# Patient Record
Sex: FEMALE | Race: WHITE | Employment: OTHER | URBAN - METROPOLITAN AREA
[De-identification: names, ages, dates, MRNs, and addresses within clinical notes are randomized per-mention and may not be internally consistent; named-entity substitution may affect disease eponyms.]

---

## 2017-07-06 PROBLEM — R53.1 WEAKNESS: Status: ACTIVE | Noted: 2017-07-06

## 2017-07-06 PROBLEM — Z86.718 HISTORY OF DVT (DEEP VEIN THROMBOSIS): Chronic | Status: ACTIVE | Noted: 2017-07-06

## 2017-07-06 PROBLEM — E03.9 ACQUIRED HYPOTHYROIDISM: Chronic | Status: ACTIVE | Noted: 2017-07-06

## 2017-07-07 PROBLEM — E06.3 AUTOIMMUNE HYPOTHYROIDISM: Chronic | Status: ACTIVE | Noted: 2017-07-06

## 2017-07-09 PROBLEM — I70.0 ATHEROSCLEROSIS OF AORTIC ARCH (HCC): Chronic | Status: ACTIVE | Noted: 2017-07-07

## 2017-07-31 PROBLEM — I70.90 ATHEROSCLEROTIC PLAQUE: Chronic | Status: ACTIVE | Noted: 2017-07-31

## 2019-09-30 ENCOUNTER — OFFICE VISIT (OUTPATIENT)
Dept: VASCULAR SURGERY | Age: 84
End: 2019-09-30
Payer: MEDICARE

## 2019-09-30 DIAGNOSIS — M79.604 PAIN IN BOTH LOWER EXTREMITIES: Primary | ICD-10-CM

## 2019-09-30 DIAGNOSIS — M79.605 PAIN IN BOTH LOWER EXTREMITIES: Primary | ICD-10-CM

## 2019-09-30 PROCEDURE — 1123F ACP DISCUSS/DSCN MKR DOCD: CPT | Performed by: NURSE PRACTITIONER

## 2019-09-30 PROCEDURE — G8421 BMI NOT CALCULATED: HCPCS | Performed by: NURSE PRACTITIONER

## 2019-09-30 PROCEDURE — 99213 OFFICE O/P EST LOW 20 MIN: CPT | Performed by: NURSE PRACTITIONER

## 2019-09-30 PROCEDURE — 4040F PNEUMOC VAC/ADMIN/RCVD: CPT | Performed by: NURSE PRACTITIONER

## 2019-09-30 PROCEDURE — G8427 DOCREV CUR MEDS BY ELIG CLIN: HCPCS | Performed by: NURSE PRACTITIONER

## 2019-09-30 PROCEDURE — G8598 ASA/ANTIPLAT THER USED: HCPCS | Performed by: NURSE PRACTITIONER

## 2019-09-30 PROCEDURE — 1036F TOBACCO NON-USER: CPT | Performed by: NURSE PRACTITIONER

## 2019-09-30 PROCEDURE — 1090F PRES/ABSN URINE INCON ASSESS: CPT | Performed by: NURSE PRACTITIONER

## 2019-09-30 NOTE — PROGRESS NOTES
 Stroke Sister     Heart Disease Father      Labs  Lab Results   Component Value Date    WBC 8.1 07/10/2017    HGB 10.3 (L) 07/10/2017    HCT 31.0 (L) 07/10/2017     07/10/2017    PROTIME 47.2 (H) 07/10/2017    INR 4.1 07/10/2017    APTT 20.7 (L) 06/30/2017    K 4.3 07/10/2017    BUN 12 07/10/2017    CREATININE 0.9 07/10/2017       PHYSICAL EXAM:    CONSTITUTIONAL:   Awake, alert, cooperative  PSYCHIATRIC :  Oriented to time, place and person     Appropriate insight to disease process  EYES: Lids and lashes normal  ENT:  External ears and nose without lesions   Hearing deficits notnoted  NECK: Supple, symmetrical, trachea midline   Carotid bruit not noted  LUNGS:  No increased work of breathing                 Clear to auscultation  CARDIOVASCULAR:  regular rate and rhythm   ABDOMEN:  soft, non-distended, non-tender   Aorta is not palpable  SKIN:   Normal skin color   Texture and turgor normal, no induration  EXTREMITIES:   R LE Edema absent   2+ DP/PT  L LE Edema absent   2+ DP/PT    RADIOLOGY:    A/P Bilateral Leg Pain  · Not related to circulation as pt has palpable pulses bilaterally   · Likely also not related to hx DVT, IVC filter as swelling is well controlled  · More likely related to knee joint  ·  Instructed pt to follow up with PCP regarding these symptoms   · all questions answered    Pt seen and plan reviewed with Dr. Jayro Bonilla.      Narciso Huang, CNP

## 2019-10-02 ENCOUNTER — HOSPITAL ENCOUNTER (OUTPATIENT)
Dept: GENERAL RADIOLOGY | Age: 84
Discharge: HOME | End: 2019-10-04
Payer: MEDICARE

## 2019-10-02 ENCOUNTER — HOSPITAL ENCOUNTER (OUTPATIENT)
Age: 84
Discharge: HOME | End: 2019-10-04
Payer: MEDICARE

## 2019-10-02 DIAGNOSIS — M25.561 RIGHT KNEE PAIN, UNSPECIFIED CHRONICITY: ICD-10-CM

## 2019-10-02 PROCEDURE — 73564 X-RAY EXAM KNEE 4 OR MORE: CPT

## 2021-01-20 ENCOUNTER — IMMUNIZATION (OUTPATIENT)
Dept: PRIMARY CARE CLINIC | Age: 86
End: 2021-01-20
Payer: MEDICARE

## 2021-01-20 PROCEDURE — 91300 COVID-19, PFIZER VACCINE 30MCG/0.3ML DOSE: CPT | Performed by: NURSE PRACTITIONER

## 2021-01-20 PROCEDURE — 0001A COVID-19, PFIZER VACCINE 30MCG/0.3ML DOSE: CPT | Performed by: NURSE PRACTITIONER

## 2021-02-10 ENCOUNTER — IMMUNIZATION (OUTPATIENT)
Dept: PRIMARY CARE CLINIC | Age: 86
End: 2021-02-10
Payer: MEDICARE

## 2021-02-10 PROCEDURE — 0002A COVID-19, PFIZER VACCINE 30MCG/0.3ML DOSE: CPT | Performed by: NURSE PRACTITIONER

## 2021-02-10 PROCEDURE — 91300 COVID-19, PFIZER VACCINE 30MCG/0.3ML DOSE: CPT | Performed by: NURSE PRACTITIONER

## 2021-07-28 ENCOUNTER — HOSPITAL ENCOUNTER (OUTPATIENT)
Age: 86
Discharge: HOME | End: 2021-07-30
Payer: MEDICARE

## 2021-07-28 ENCOUNTER — HOSPITAL ENCOUNTER (OUTPATIENT)
Dept: GENERAL RADIOLOGY | Age: 86
Discharge: HOME | End: 2021-07-30
Payer: MEDICARE

## 2021-07-28 DIAGNOSIS — M54.50 LOW BACK PAIN, UNSPECIFIED BACK PAIN LATERALITY, UNSPECIFIED CHRONICITY, UNSPECIFIED WHETHER SCIATICA PRESENT: ICD-10-CM

## 2021-07-28 PROCEDURE — 72110 X-RAY EXAM L-2 SPINE 4/>VWS: CPT

## 2021-08-12 ENCOUNTER — HOSPITAL ENCOUNTER (OUTPATIENT)
Dept: MRI IMAGING | Age: 86
Discharge: HOME | End: 2021-08-14
Payer: MEDICARE

## 2021-08-12 DIAGNOSIS — M54.50 LOW BACK PAIN, UNSPECIFIED BACK PAIN LATERALITY, UNSPECIFIED CHRONICITY, UNSPECIFIED WHETHER SCIATICA PRESENT: ICD-10-CM

## 2021-08-12 DIAGNOSIS — M79.605 LEFT LEG PAIN: ICD-10-CM

## 2021-08-12 PROCEDURE — 72148 MRI LUMBAR SPINE W/O DYE: CPT

## 2023-04-03 ENCOUNTER — HOSPITAL ENCOUNTER (OUTPATIENT)
Dept: NEUROLOGY | Age: 88
Discharge: HOME | End: 2023-04-03
Payer: MEDICARE

## 2023-04-03 VITALS — WEIGHT: 135 LBS | BODY MASS INDEX: 27.21 KG/M2 | HEIGHT: 59 IN

## 2023-04-03 PROCEDURE — 95886 MUSC TEST DONE W/N TEST COMP: CPT

## 2023-04-03 PROCEDURE — 95911 NRV CNDJ TEST 9-10 STUDIES: CPT

## 2023-04-03 NOTE — PROCEDURES
compatible with following:    #1. Normal nerve conduction studies of the following motor and sensory nerves bilaterally: Peroneal, tibial, superficial peroneal, and sural nerves. #2. Insertional activity abnormal with findings of positive waves, fibrillation potentials, change in amplitude and duration as well as polyphasic units all demonstrating neuropathic changes predominantly in the L5-S1 distribution however affecting L4 level bilaterally. These neuropathic changes affect both anterior and posterior rami fibers. This can be compatible with central spinal canal disease as well as diffuse lateral disease at the neuroforaminal areas. This represents a motor radiculopathy that may also affect sensory nerves etiology is to be determined. Recommendations: Further assessment with history, physical examination as well as imaging studies for appropriate relation and diagnosis. This has been discussed with the patient and her son. Please see letter enclosed in her chart. Thank you    . Clinical correlation recommended. Electronically signed by Demetri Baez MD on 2/1/5342 at 3:52 PM   Sullivan Krabbe is a 80 y.o. female patient. No diagnosis found. Past Medical History:   Diagnosis Date    Atherosclerotic plaque 7/31/2017    Aorta    Bilateral ovarian cysts     Disease of blood and blood forming organ     Hypothyroidism     Pneumonia     Temporary low platelet count (HCC)     Thrombosis 5/2015    PE     Height 4' 11\" (1.499 m), weight 135 lb (61.2 kg).     Procedures    Demetri Baez MD  5/2/1594

## 2023-04-03 NOTE — LETTER
sural nerves bilaterally. F waves of the peroneal and tibial nerves were normal bilaterally and H-reflexes of the tibial nerves bilaterally within normal limits. .     Insertional activity in the lower extremity revealing diffuse changes in increased amplitude and duration with decreased number in both quadriceps as well as muscles in the anterior and posterior compartment distal to the knees. In addition, fibrillation potentials, positive waves, serrated potentials and polyphasic units were found in the L5 and S1 predominant distribution by minor changes in the vastus medialis. Impression:  Study compatible with following:     #1. Normal nerve conduction studies of the following motor and sensory nerves bilaterally: Peroneal, tibial, superficial peroneal, and sural nerves. #2. Insertional activity abnormal with findings of positive waves, fibrillation potentials, change in amplitude and duration as well as polyphasic units all demonstrating neuropathic changes predominantly in the L5-S1 distribution however affecting L4 level bilaterally. These neuropathic changes affect both anterior and posterior rami fibers. This can be compatible with central spinal canal disease as well as diffuse lateral disease at the neuroforaminal areas. This represents a motor radiculopathy that may also affect sensory nerves etiology is to be determined. Recommendations: Further assessment with history, physical examination as well as imaging studies for appropriate relation and diagnosis. This has been discussed with the patient and her son. Please see letter enclosed in her chart. Thank you     . Clinical correlation recommended. Electronically signed by Monty Joshi MD on 0/8/4381 at 3:52 PM   Everton Morales is a 80 y.o. female patient. No diagnosis found.   Past Medical History        Past Medical History:   Diagnosis Date    Atherosclerotic plaque 7/31/2017     Aorta    Bilateral ovarian

## 2024-10-20 ENCOUNTER — APPOINTMENT (OUTPATIENT)
Dept: GENERAL RADIOLOGY | Age: 89
DRG: 552 | End: 2024-10-20
Payer: MEDICARE

## 2024-10-20 ENCOUNTER — APPOINTMENT (OUTPATIENT)
Dept: CT IMAGING | Age: 89
DRG: 552 | End: 2024-10-20
Payer: MEDICARE

## 2024-10-20 ENCOUNTER — HOSPITAL ENCOUNTER (INPATIENT)
Age: 89
LOS: 1 days | Discharge: HOME | DRG: 552 | End: 2024-10-21
Attending: EMERGENCY MEDICINE | Admitting: SURGERY
Payer: MEDICARE

## 2024-10-20 DIAGNOSIS — Z79.01 ANTICOAGULATED: ICD-10-CM

## 2024-10-20 DIAGNOSIS — S02.2XXA CLOSED FRACTURE OF NASAL BONE, INITIAL ENCOUNTER: ICD-10-CM

## 2024-10-20 DIAGNOSIS — S12.100A CLOSED ODONTOID FRACTURE, INITIAL ENCOUNTER (HCC): ICD-10-CM

## 2024-10-20 DIAGNOSIS — S01.81XA FACIAL LACERATION, INITIAL ENCOUNTER: Primary | ICD-10-CM

## 2024-10-20 DIAGNOSIS — S12.000A CLOSED DISPLACED FRACTURE OF FIRST CERVICAL VERTEBRA, UNSPECIFIED FRACTURE MORPHOLOGY, INITIAL ENCOUNTER (HCC): ICD-10-CM

## 2024-10-20 PROBLEM — S12.090A FRACTURE OF ANTERIOR ARCH OF C1, CLOSED, INITIAL ENCOUNTER (HCC): Status: ACTIVE | Noted: 2024-10-20

## 2024-10-20 LAB
ALBUMIN SERPL-MCNC: 3.9 G/DL (ref 3.5–5.2)
ALP SERPL-CCNC: 85 U/L (ref 35–104)
ALT SERPL-CCNC: 10 U/L (ref 0–32)
ANION GAP SERPL CALCULATED.3IONS-SCNC: 9 MMOL/L (ref 7–16)
AST SERPL-CCNC: 17 U/L (ref 0–31)
BASOPHILS # BLD: 0.03 K/UL (ref 0–0.2)
BASOPHILS NFR BLD: 0 % (ref 0–2)
BILIRUB SERPL-MCNC: 0.4 MG/DL (ref 0–1.2)
BUN SERPL-MCNC: 23 MG/DL (ref 6–23)
CALCIUM SERPL-MCNC: 9.2 MG/DL (ref 8.6–10.2)
CHLORIDE SERPL-SCNC: 102 MMOL/L (ref 98–107)
CO2 SERPL-SCNC: 25 MMOL/L (ref 22–29)
CREAT SERPL-MCNC: 1 MG/DL (ref 0.5–1)
EOSINOPHIL # BLD: 0.18 K/UL (ref 0.05–0.5)
EOSINOPHILS RELATIVE PERCENT: 2 % (ref 0–6)
ERYTHROCYTE [DISTWIDTH] IN BLOOD BY AUTOMATED COUNT: 13.6 % (ref 11.5–15)
GFR, ESTIMATED: 50 ML/MIN/1.73M2
GLUCOSE SERPL-MCNC: 114 MG/DL (ref 74–99)
HCT VFR BLD AUTO: 39.9 % (ref 34–48)
HGB BLD-MCNC: 12.6 G/DL (ref 11.5–15.5)
IMM GRANULOCYTES # BLD AUTO: 0.08 K/UL (ref 0–0.58)
IMM GRANULOCYTES NFR BLD: 1 % (ref 0–5)
INR PPP: 3.4
LYMPHOCYTES NFR BLD: 2.26 K/UL (ref 1.5–4)
LYMPHOCYTES RELATIVE PERCENT: 28 % (ref 20–42)
MCH RBC QN AUTO: 27.2 PG (ref 26–35)
MCHC RBC AUTO-ENTMCNC: 31.6 G/DL (ref 32–34.5)
MCV RBC AUTO: 86.2 FL (ref 80–99.9)
MONOCYTES NFR BLD: 0.48 K/UL (ref 0.1–0.95)
MONOCYTES NFR BLD: 6 % (ref 2–12)
NEUTROPHILS NFR BLD: 63 % (ref 43–80)
NEUTS SEG NFR BLD: 5.04 K/UL (ref 1.8–7.3)
PLATELET # BLD AUTO: 239 K/UL (ref 130–450)
PMV BLD AUTO: 8.8 FL (ref 7–12)
POTASSIUM SERPL-SCNC: 4 MMOL/L (ref 3.5–5)
PROT SERPL-MCNC: 7 G/DL (ref 6.4–8.3)
PROTHROMBIN TIME: 37.9 SEC (ref 9.3–12.4)
RBC # BLD AUTO: 4.63 M/UL (ref 3.5–5.5)
SODIUM SERPL-SCNC: 136 MMOL/L (ref 132–146)
WBC OTHER # BLD: 8.1 K/UL (ref 4.5–11.5)

## 2024-10-20 PROCEDURE — 70450 CT HEAD/BRAIN W/O DYE: CPT

## 2024-10-20 PROCEDURE — 85610 PROTHROMBIN TIME: CPT

## 2024-10-20 PROCEDURE — 6370000000 HC RX 637 (ALT 250 FOR IP)

## 2024-10-20 PROCEDURE — 99285 EMERGENCY DEPT VISIT HI MDM: CPT

## 2024-10-20 PROCEDURE — G0378 HOSPITAL OBSERVATION PER HR: HCPCS

## 2024-10-20 PROCEDURE — 72125 CT NECK SPINE W/O DYE: CPT

## 2024-10-20 PROCEDURE — 90714 TD VACC NO PRESV 7 YRS+ IM: CPT

## 2024-10-20 PROCEDURE — 0HQ1XZZ REPAIR FACE SKIN, EXTERNAL APPROACH: ICD-10-PCS | Performed by: SURGERY

## 2024-10-20 PROCEDURE — 1200000000 HC SEMI PRIVATE

## 2024-10-20 PROCEDURE — 72170 X-RAY EXAM OF PELVIS: CPT

## 2024-10-20 PROCEDURE — 12013 RPR F/E/E/N/L/M 2.6-5.0 CM: CPT

## 2024-10-20 PROCEDURE — 80053 COMPREHEN METABOLIC PANEL: CPT

## 2024-10-20 PROCEDURE — 71045 X-RAY EXAM CHEST 1 VIEW: CPT

## 2024-10-20 PROCEDURE — 70498 CT ANGIOGRAPHY NECK: CPT

## 2024-10-20 PROCEDURE — 2580000003 HC RX 258

## 2024-10-20 PROCEDURE — 6360000002 HC RX W HCPCS

## 2024-10-20 PROCEDURE — 70486 CT MAXILLOFACIAL W/O DYE: CPT

## 2024-10-20 PROCEDURE — 85025 COMPLETE CBC W/AUTO DIFF WBC: CPT

## 2024-10-20 PROCEDURE — 90471 IMMUNIZATION ADMIN: CPT

## 2024-10-20 PROCEDURE — 2500000003 HC RX 250 WO HCPCS

## 2024-10-20 PROCEDURE — 6360000004 HC RX CONTRAST MEDICATION: Performed by: RADIOLOGY

## 2024-10-20 RX ORDER — ACETAMINOPHEN 325 MG/1
650 TABLET ORAL EVERY 6 HOURS SCHEDULED
Status: DISCONTINUED | OUTPATIENT
Start: 2024-10-20 | End: 2024-10-21 | Stop reason: HOSPADM

## 2024-10-20 RX ORDER — ONDANSETRON 4 MG/1
4 TABLET, ORALLY DISINTEGRATING ORAL EVERY 8 HOURS PRN
Status: DISCONTINUED | OUTPATIENT
Start: 2024-10-20 | End: 2024-10-21 | Stop reason: HOSPADM

## 2024-10-20 RX ORDER — SODIUM CHLORIDE 0.9 % (FLUSH) 0.9 %
5-40 SYRINGE (ML) INJECTION PRN
Status: DISCONTINUED | OUTPATIENT
Start: 2024-10-20 | End: 2024-10-21 | Stop reason: HOSPADM

## 2024-10-20 RX ORDER — LISINOPRIL 2.5 MG/1
2.5 TABLET ORAL DAILY
COMMUNITY

## 2024-10-20 RX ORDER — SODIUM CHLORIDE 0.9 % (FLUSH) 0.9 %
10 SYRINGE (ML) INJECTION
Status: ACTIVE | OUTPATIENT
Start: 2024-10-20 | End: 2024-10-21

## 2024-10-20 RX ORDER — LEVOTHYROXINE SODIUM 75 UG/1
75 TABLET ORAL DAILY
Status: DISCONTINUED | OUTPATIENT
Start: 2024-10-20 | End: 2024-10-21 | Stop reason: HOSPADM

## 2024-10-20 RX ORDER — ONDANSETRON 2 MG/ML
4 INJECTION INTRAMUSCULAR; INTRAVENOUS EVERY 6 HOURS PRN
Status: DISCONTINUED | OUTPATIENT
Start: 2024-10-20 | End: 2024-10-21 | Stop reason: HOSPADM

## 2024-10-20 RX ORDER — IOPAMIDOL 755 MG/ML
60 INJECTION, SOLUTION INTRAVASCULAR
Status: COMPLETED | OUTPATIENT
Start: 2024-10-20 | End: 2024-10-20

## 2024-10-20 RX ORDER — POLYETHYLENE GLYCOL 3350 17 G/17G
17 POWDER, FOR SOLUTION ORAL DAILY PRN
Status: DISCONTINUED | OUTPATIENT
Start: 2024-10-20 | End: 2024-10-21 | Stop reason: HOSPADM

## 2024-10-20 RX ORDER — LIDOCAINE HYDROCHLORIDE 10 MG/ML
20 INJECTION, SOLUTION INFILTRATION; PERINEURAL ONCE
Status: COMPLETED | OUTPATIENT
Start: 2024-10-20 | End: 2024-10-20

## 2024-10-20 RX ORDER — OXYCODONE HYDROCHLORIDE 5 MG/1
5 TABLET ORAL EVERY 4 HOURS PRN
Status: DISCONTINUED | OUTPATIENT
Start: 2024-10-20 | End: 2024-10-21 | Stop reason: HOSPADM

## 2024-10-20 RX ORDER — SODIUM CHLORIDE 0.9 % (FLUSH) 0.9 %
5-40 SYRINGE (ML) INJECTION EVERY 12 HOURS SCHEDULED
Status: DISCONTINUED | OUTPATIENT
Start: 2024-10-20 | End: 2024-10-21 | Stop reason: HOSPADM

## 2024-10-20 RX ORDER — SODIUM CHLORIDE 9 MG/ML
INJECTION, SOLUTION INTRAVENOUS PRN
Status: DISCONTINUED | OUTPATIENT
Start: 2024-10-20 | End: 2024-10-21 | Stop reason: HOSPADM

## 2024-10-20 RX ORDER — OXYCODONE HYDROCHLORIDE 5 MG/1
2.5 TABLET ORAL EVERY 4 HOURS PRN
Status: DISCONTINUED | OUTPATIENT
Start: 2024-10-20 | End: 2024-10-21 | Stop reason: HOSPADM

## 2024-10-20 RX ORDER — SODIUM CHLORIDE 9 MG/ML
INJECTION, SOLUTION INTRAVENOUS CONTINUOUS
Status: DISCONTINUED | OUTPATIENT
Start: 2024-10-20 | End: 2024-10-20

## 2024-10-20 RX ADMIN — SODIUM CHLORIDE, PRESERVATIVE FREE 10 ML: 5 INJECTION INTRAVENOUS at 21:21

## 2024-10-20 RX ADMIN — LIDOCAINE HYDROCHLORIDE 20 ML: 10 INJECTION, SOLUTION INFILTRATION; PERINEURAL at 05:55

## 2024-10-20 RX ADMIN — ACETAMINOPHEN 650 MG: 325 TABLET ORAL at 17:13

## 2024-10-20 RX ADMIN — CLOSTRIDIUM TETANI TOXOID ANTIGEN (FORMALDEHYDE INACTIVATED) AND CORYNEBACTERIUM DIPHTHERIAE TOXOID ANTIGEN (FORMALDEHYDE INACTIVATED) 0.5 ML: 5; 2 INJECTION, SUSPENSION INTRAMUSCULAR at 04:35

## 2024-10-20 RX ADMIN — LEVOTHYROXINE SODIUM 75 MCG: 0.07 TABLET ORAL at 17:14

## 2024-10-20 RX ADMIN — IOPAMIDOL 60 ML: 755 INJECTION, SOLUTION INTRAVENOUS at 13:11

## 2024-10-20 ASSESSMENT — PAIN SCALES - WONG BAKER: WONGBAKER_NUMERICALRESPONSE: NO HURT

## 2024-10-20 ASSESSMENT — PAIN DESCRIPTION - DESCRIPTORS: DESCRIPTORS: ACHING;DISCOMFORT;SORE

## 2024-10-20 ASSESSMENT — PAIN - FUNCTIONAL ASSESSMENT: PAIN_FUNCTIONAL_ASSESSMENT: PREVENTS OR INTERFERES SOME ACTIVE ACTIVITIES AND ADLS

## 2024-10-20 ASSESSMENT — PAIN DESCRIPTION - LOCATION: LOCATION: NECK

## 2024-10-20 ASSESSMENT — PAIN SCALES - GENERAL: PAINLEVEL_OUTOF10: 1

## 2024-10-20 ASSESSMENT — LIFESTYLE VARIABLES
HOW OFTEN DO YOU HAVE A DRINK CONTAINING ALCOHOL: NEVER
HOW MANY STANDARD DRINKS CONTAINING ALCOHOL DO YOU HAVE ON A TYPICAL DAY: PATIENT DOES NOT DRINK

## 2024-10-20 NOTE — CONSULTS
89-year-old woman with a past medical history of taking Coumadin for history of deep venous thrombosis.  Patient reportedly fell at 3 AM in the morning and was taken to [facility] for evaluation.  Patient states that she did not lose consciousness but had severe neck pain after she fell.  Patient was evaluated by the trauma team and neurosurgery was called for neurodiagnostic imaging.  Head CT revealed no evidence of intracranial hemorrhage however the CT of the cervical spine revealed a type II dens fracture as well as a fracture of the anterior arch of C1.  Patient remains with normal neurologic examination.  Recommended the placement of the neck custom Aspen brace to be worn at all times.  Patient will need to follow-up as an outpatient with neurosurgery.  Fractures of this nature have poor union in the patients of this age and patient will most likely need a collar for a long term period.  These recommendations were discussed earlier in the early a.m. with the trauma team.  
TID  Follow up outpatient in 2 weeks    Patient seen and examined by resident. Will discuss with attending on call.     Electronically signed by Martin Hooker DO on 10/20/24 at 11:14 AM EDT

## 2024-10-20 NOTE — PROGRESS NOTES
Messaged Alyssa Ferguson regarding home medications of coumadin and Lisinopril d/t family requesting. Response was waiting on tertiary care and to be sure of no other injuries.

## 2024-10-20 NOTE — ED NOTES
External catheter failed.  Incontinence care provided, external catheter properly replaced.   Gen: AAOx3, non-toxic  Head: NCAT  HEENT: EOMI, oral mucosa moist, pharyngeal erythema with no exudates or uvula swelling. Right cervical painful lymphadenopathy.   Lung: CTAB, no respiratory distress, no wheezes/rhonchi/rales B/L, speaking in full sentences with hoarse voice  CV: RRR, no murmurs, rubs or gallops  Abd: soft, NTND, no guarding, no CVA tenderness  MSK: no visible deformities  Neuro: No focal sensory or motor deficits, normal CN exam   Skin: Warm, well perfused, no rash or hives  Psych: normal affect.

## 2024-10-20 NOTE — ED NOTES
Upon entering patients room, patient and present family members have patient out of bed and attempting to ambulate to the restroom. This RN informed family to hit call light if they need assistance and that patient should not be up out of bed due to present injuries and is a high fall risk. Assisted patient back to bed and put patient on pure wick. Patients family asking if pt can have water. Educated patient and family not at this time due to injuries to neck and per resident orders.

## 2024-10-20 NOTE — PROGRESS NOTES
Spoke with Thee from [place] and all papers faxed for [employer]. Spoke with Dr. Thorne regarding new consult and he stated and he had already seen patient in ER.  I added him to treatment team.

## 2024-10-20 NOTE — ED NOTES
External catheter failed. Incontinence care provided. Patient requesting bed pan from this point on. Disposable underpants applied.

## 2024-10-20 NOTE — PROGRESS NOTES
4 Eyes Skin Assessment     NAME:  Jeronimo Atkins  YOB: 1930  MEDICAL RECORD NUMBER:  [id number]    The patient is being assessed for  Admission    I agree that at least one RN has performed a thorough Head to Toe Skin Assessment on the patient. ALL assessment sites listed below have been assessed.      Areas assessed by both nurses:    Head, Face, Ears, Shoulders, Back, Chest, Arms, Elbows, Hands, Sacrum. Buttock, Coccyx, Ischium, and Legs. Feet and Heels    FACE: both eyes bruised and left brow laceration/sutured. NOSE: abrasion to bridge  BUE: ecchymosis  BUTTOCK: right side ecchymosis            Does the Patient have a Wound? No noted wound(s)       Andrei Prevention initiated by RN: Yes  Wound Care Orders initiated by RN: No    Pressure Injury (Stage 3,4, Unstageable, DTI, NWPT, and Complex wounds) if present, place Wound referral order by RN under : No    New Ostomies, if present place, Ostomy referral order under : No     Nurse 1 eSignature: Electronically signed by Veronica Rodriguez RN on 10/20/24 at 4:18 PM EDT    **SHARE this note so that the co-signing nurse can place an eSignature**    Nurse 2 eSignature: {Esignature:[id number]}

## 2024-10-20 NOTE — ED PROVIDER NOTES
every 30 days    ELASTIC BANDAGES & SUPPORTS (JOBST KNEE HIGH COMPRESSION SM) MISC    Compression stockings 20-30 mm hg thigh high bilaterally  Dx : Venous Insufficiency, Swelling, dvt  Please provide with michael    LEVOTHYROXINE (SYNTHROID) 88 MCG TABLET    Take 88 mcg by mouth Daily    WARFARIN (COUMADIN) 1 MG TABLET    Take 1 tab oral daily       ALLERGIES     Heparin    FAMILYHISTORY       Family History   Problem Relation Age of Onset    Stroke Sister     Heart Disease Father         SOCIAL HISTORY       Social History     Tobacco Use    Smoking status: Never    Smokeless tobacco: Never   Vaping Use    Vaping status: Never Used   Substance Use Topics    Alcohol use: No     Alcohol/week: 0.0 standard drinks of alcohol    Drug use: No       SCREENINGS        [place] Coma Scale  Eye Opening: Spontaneous  Best Verbal Response: Oriented  Best Motor Response: Obeys commands  [place] Coma Scale Score: 15                CIWA Assessment  BP: (!) 147/56  Pulse: 75           PHYSICAL EXAM  1 or more Elements     ED Triage Vitals   BP Systolic BP Percentile Diastolic BP Percentile Temp Temp Source Pulse Respirations SpO2   10/20/24 0412 -- -- 10/20/24 0406 10/20/24 0406 10/20/24 0412 10/20/24 0412 10/20/24 0412   (!) 157/91   98.4 °F (36.9 °C) Temporal 81 16 97 %      Height Weight - Scale         10/20/24 0412 10/20/24 0412         1.422 m (4' 8\") 56.2 kg (124 lb)                 Constitutional/General: Alert and oriented x3  Head: Normocephalic 3 cm laceration to the left eyebrow.  1 cm laceration across the bridge of the nose.  Eyes: PERRL, EOMI, conjunctiva normal, sclera non icteric  ENT:  Oropharynx clear, handling secretions, no trismus, no asymmetry of the posterior oropharynx or uvular edema  Neck: Supple, full ROM, no stridor, no meningeal signs.  No C-spine tenderness to palpation.  Respiratory: Lungs clear to auscultation bilaterally, no wheezes, rales, or rhonchi. Not in respiratory distress  Cardiovascular:

## 2024-10-20 NOTE — H&P
TRAUMA HISTORY & PHYSICAL  Attending/Surgical Resident/Advance Practice Nurse  10/20/2024  12:09 PM    PRIMARY SURVEY    CHIEF COMPLAINT:  Trauma consult.    89-year-old female presenting status post mechanical ground-level fall proximately 3 AM this morning.  States that she was in her bathroom when she tripped over her cane and hit her head.  Denies any loss of consciousness.  She was able to get up and clean up her blood and called her family.  She currently only complains of neck pain.  History significant for taking warfarin due to history of DVTs.  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  Initial INR 3.4.  CT head negative.  CT C-spine shows minimally displaced anterior arch C1 fracture and mildly displaced C2 dens fracture.  Negative for hematoma.  CT face shows bilateral nasal bone fracture.  She has a left supraorbital laceration that was s/p repair by ED.    AIRWAY:   Airway Normal    EMS ETT Absent  Noisy respirations Absent  Retractions: Absent  Vomiting/bleeding: Absent    BREATHING:    Midaxillary breath sound left:  Normal  Midaxillary breath sound right:  Normal    Cough sound intensity:  good    FiO2:  Room air    SMI 1250 mL.     CIRCULATION:   Femerol pulse intensity: Strong  Palpebral conjunctiva: Pink     Vitals:    10/20/24 1122   BP: (!) 152/63   Pulse: 80   Resp: 20   Temp:    SpO2: 96%       Vitals:    10/20/24 0800 10/20/24 0830 10/20/24 1115 10/20/24 1122   BP: (!) 157/61 (!) 148/56  (!) 152/63   Pulse: 76 82  80   Resp: 20 14  20   Temp:   98.6 °F (37 °C)    TempSrc:       SpO2: 96% 94%  96%   Weight:       Height:            FAST EXAM: Deferred    Central Nervous System    GCS Initial 15 minutes   Eye  Motor  Verbal 4 - Opens eyes on own  6 - Follows simple motor commands  5 - Alert and oriented 4 - Opens eyes on own  6 - Follows simple motor commands  5 - Alert and oriented     Neuromuscular blockade: No  Pupil size:  Left 3

## 2024-10-21 VITALS
DIASTOLIC BLOOD PRESSURE: 48 MMHG | TEMPERATURE: 97.7 F | HEIGHT: 56 IN | SYSTOLIC BLOOD PRESSURE: 125 MMHG | HEART RATE: 70 BPM | OXYGEN SATURATION: 97 % | WEIGHT: 124 LBS | RESPIRATION RATE: 16 BRPM | BODY MASS INDEX: 27.9 KG/M2

## 2024-10-21 LAB
ANION GAP SERPL CALCULATED.3IONS-SCNC: 10 MMOL/L (ref 7–16)
BASOPHILS # BLD: 0.04 K/UL (ref 0–0.2)
BASOPHILS NFR BLD: 0 % (ref 0–2)
BUN SERPL-MCNC: 16 MG/DL (ref 6–23)
CALCIUM SERPL-MCNC: 9.2 MG/DL (ref 8.6–10.2)
CHLORIDE SERPL-SCNC: 106 MMOL/L (ref 98–107)
CO2 SERPL-SCNC: 23 MMOL/L (ref 22–29)
CREAT SERPL-MCNC: 0.9 MG/DL (ref 0.5–1)
EOSINOPHIL # BLD: 0.1 K/UL (ref 0.05–0.5)
EOSINOPHILS RELATIVE PERCENT: 1 % (ref 0–6)
ERYTHROCYTE [DISTWIDTH] IN BLOOD BY AUTOMATED COUNT: 13.2 % (ref 11.5–15)
GFR, ESTIMATED: 59 ML/MIN/1.73M2
GLUCOSE SERPL-MCNC: 102 MG/DL (ref 74–99)
HCT VFR BLD AUTO: 40.6 % (ref 34–48)
HGB BLD-MCNC: 13.1 G/DL (ref 11.5–15.5)
IMM GRANULOCYTES # BLD AUTO: 0.05 K/UL (ref 0–0.58)
IMM GRANULOCYTES NFR BLD: 1 % (ref 0–5)
INR PPP: 3.5
LYMPHOCYTES NFR BLD: 1.62 K/UL (ref 1.5–4)
LYMPHOCYTES RELATIVE PERCENT: 16 % (ref 20–42)
MCH RBC QN AUTO: 27.2 PG (ref 26–35)
MCHC RBC AUTO-ENTMCNC: 32.3 G/DL (ref 32–34.5)
MCV RBC AUTO: 84.4 FL (ref 80–99.9)
MONOCYTES NFR BLD: 0.65 K/UL (ref 0.1–0.95)
MONOCYTES NFR BLD: 6 % (ref 2–12)
NEUTROPHILS NFR BLD: 76 % (ref 43–80)
NEUTS SEG NFR BLD: 7.73 K/UL (ref 1.8–7.3)
PLATELET # BLD AUTO: 238 K/UL (ref 130–450)
PMV BLD AUTO: 9.3 FL (ref 7–12)
POTASSIUM SERPL-SCNC: 4.4 MMOL/L (ref 3.5–5)
PROTHROMBIN TIME: 38.9 SEC (ref 9.3–12.4)
RBC # BLD AUTO: 4.81 M/UL (ref 3.5–5.5)
SODIUM SERPL-SCNC: 139 MMOL/L (ref 132–146)
WBC OTHER # BLD: 10.2 K/UL (ref 4.5–11.5)

## 2024-10-21 PROCEDURE — 97530 THERAPEUTIC ACTIVITIES: CPT

## 2024-10-21 PROCEDURE — 97165 OT EVAL LOW COMPLEX 30 MIN: CPT

## 2024-10-21 PROCEDURE — 36415 COLL VENOUS BLD VENIPUNCTURE: CPT

## 2024-10-21 PROCEDURE — G0378 HOSPITAL OBSERVATION PER HR: HCPCS

## 2024-10-21 PROCEDURE — 80048 BASIC METABOLIC PNL TOTAL CA: CPT

## 2024-10-21 PROCEDURE — 6370000000 HC RX 637 (ALT 250 FOR IP)

## 2024-10-21 PROCEDURE — 85025 COMPLETE CBC W/AUTO DIFF WBC: CPT

## 2024-10-21 PROCEDURE — 97161 PT EVAL LOW COMPLEX 20 MIN: CPT

## 2024-10-21 PROCEDURE — 2580000003 HC RX 258

## 2024-10-21 PROCEDURE — 99232 SBSQ HOSP IP/OBS MODERATE 35: CPT | Performed by: STUDENT IN AN ORGANIZED HEALTH CARE EDUCATION/TRAINING PROGRAM

## 2024-10-21 PROCEDURE — L0172 CERV COL SR FOAM 2PC PRE OTS: HCPCS

## 2024-10-21 PROCEDURE — 85610 PROTHROMBIN TIME: CPT

## 2024-10-21 PROCEDURE — 97535 SELF CARE MNGMENT TRAINING: CPT

## 2024-10-21 RX ADMIN — ACETAMINOPHEN 650 MG: 325 TABLET ORAL at 17:47

## 2024-10-21 RX ADMIN — SODIUM CHLORIDE, PRESERVATIVE FREE 10 ML: 5 INJECTION INTRAVENOUS at 08:38

## 2024-10-21 RX ADMIN — LEVOTHYROXINE SODIUM 75 MCG: 0.07 TABLET ORAL at 06:51

## 2024-10-21 RX ADMIN — ACETAMINOPHEN 650 MG: 325 TABLET ORAL at 11:39

## 2024-10-21 ASSESSMENT — ENCOUNTER SYMPTOMS
DIARRHEA: 0
COLOR CHANGE: 1
BLOOD IN STOOL: 0
BACK PAIN: 0
NAUSEA: 0
SHORTNESS OF BREATH: 0
FACIAL SWELLING: 1
VOMITING: 0
COUGH: 0
ABDOMINAL PAIN: 0
CONSTIPATION: 0

## 2024-10-21 NOTE — DISCHARGE INSTRUCTIONS
TRAUMA SERVICES DISCHARGE INSTRUCTIONS    **please follow up with PCP this week prior to resuming warfarin as INR is high and will need to begin rechecking    Call [phone], option 2, for any questions/concerns and for follow-up appointment in 2 week(s).    Please follow the instructions checked below:    Please follow-up with your primary care provider.    ACTIVITY INSTRUCTIONS  Increase activity as tolerated  No heavy lifting or strenuous activity  Take your incentive spirometer home and use 4-6 times/day   [x]  No driving until cleared by Trauma & Neurosurgery    WOUND/DRESSING INSTRUCTIONS:  You may shower.  No sitting in bath tub, hot tub or swimming until cleared by physician.  Ice to areas of pain for first 24 hours.  Heat to areas of pain after that.  Wash areas of lacerations/abrasions with soap & water.  Rinse well.  Pat dry with clean towel.  Apply thin layer of Bacitracin, Neosporin, or triple antibiotic cream to affected area 2-3 times per day.  Keep wounds clean and dry.       MEDICATION INSTRUCTIONS  Take medication as prescribed.  When taking pain medications, you may experience dizziness or drowsiness.  Do not drink alcohol or drive when taking these medications.  You may experience constipation while taking pain medication.  You may take over the counter stool softeners such as docusate (Colace), sennosides S (Senokot-S), or Miralax.   []  You may take Ibuprofen (over the counter) as directed for mild pain.     --You may take up to 800mg every 8 hours for pain, please take with food or milk.   [x]  You may take acetaminophen (Tylenol) products.  Do NOT take more than 4000mg of Tylenol in 24h.   [x]  Do not take any other acetaminophen (Tylenol) products if you are taking Percocet or Norco, as these contain Tylenol.   --Do NOT take more than 4000mg of Tylenol in 24h.    OPIOID MEDICATION INSTRUCTIONS  Read the medication guide that is included with your prescription.  Take your medication

## 2024-10-21 NOTE — PROGRESS NOTES
Physical Therapy Initial Evaluation    Name: Jeronimo Atkins  : 5/15/1930  MRN: [id number]      Date of Service: 10/21/2024    Evaluating PT:  Omar Ford, PT [id number]    Referring provider/PT Order:  PT Eval and Treat   10/20/24 1230  PT eval and treat  Start:  10/20/24 1230,   End:  10/20/24 1230,   ONE TIME,   Standing Count:  1 Occurrences,   R         Martin Sterling, DO     Room #:  5213/5213-A  Diagnosis:  Anticoagulated [Z79.01]  Facial laceration, initial encounter [S01.81XA]  Closed fracture of nasal bone, initial encounter [S02.2XXA]  Fracture of anterior arch of C1, closed, initial encounter (HCC) [S12.090A]  Closed displaced fracture of first cervical vertebra, unspecified fracture morphology, initial encounter (HCC) [S12.000A]  Closed odontoid fracture, initial encounter (HCC) [S12.100A]  PMHx/PSHx:     has a past medical history of Atherosclerotic plaque, Bilateral ovarian cysts, Disease of blood and blood forming organ, Hypothyroidism, Pneumonia, Temporary low platelet count (HCC), and Thrombosis.    has a past surgical history that includes Tonsillectomy; Cholecystectomy; Appendectomy; ECHO Limited (2015); Colonoscopy; and vascular surgery.     Procedure/Surgery:  none  Precautions:  Falls,   Cervical spinal precautions and Cervical Collar  Equipment Needs: Patient has needed equipment ,    SUBJECTIVE:    Patient lives alone  in a ranch home  with 3 steps to enter with 1Rail  Bed is on 1 floor and bath is on 1 floor.  Patient ambulated independently  PTA. Equipment owned: Cane and Wheeled Walker,      States she plans having Son stay with her upon D/C??      OBJECTIVE:   Initial Evaluation  Date: 10/21/24 Treatment Short Term/ Long Term   Goals   AM-PAC 6 Clicks      Was pt agreeable to Eval/treatment? Yes      Does pt have pain? no     Bed Mobility  Rolling: SBA  Supine to sit:   SBA   Sit to supine: SBA   Scooting: SBA   Cues for spinal precautions.   Rolling: Ind  Supine to sit:

## 2024-10-21 NOTE — CARE COORDINATION
10/21/24 Transition of Care: Patient is admitted after sustaining a fall at home. She does have a fracture and laceration on her eye/nose.  She is alert and oriented. She has a collar intact. She lives alone in a ranch home with 3 steps to enter. The bed/bath are on main floor. She is independent at home. She does own a cane and wheeled walker. She has a son who will be staying with her when she is discharged. She follows with Dr Nash and her pharmacy is [facility]. The plan is to return home. She worked with therapies and was able to walk 80ft with her ww and sba. She states she is not dizzy and she \"probably lost my balance\". Will follow for readiness to discharge. Electronically signed by Diamond Gutiérrez RN CM on 10/21/2024 at 10:10 AM      Case Management Assessment  Initial Evaluation    Date/Time of Evaluation: 10/21/2024 10:10 AM  Assessment Completed by: Diamond Gutiérrez RN    If patient is discharged prior to next notation, then this note serves as note for discharge by case management.    Patient Name: Jeronimo Atkins                   YOB: 1930  Diagnosis: Anticoagulated [Z79.01]  Facial laceration, initial encounter [S01.81XA]  Closed fracture of nasal bone, initial encounter [S02.2XXA]  Fracture of anterior arch of C1, closed, initial encounter ([facility]) [S12.090A]  Closed displaced fracture of first cervical vertebra, unspecified fracture morphology, initial encounter ([facility]) [S12.000A]  Closed odontoid fracture, initial encounter ([facility]) [S12.100A]                   Date / Time: 10/20/2024  4:25 AM    Patient Admission Status: Inpatient   Readmission Risk (Low < 19, Mod (19-27), High > 27): Readmission Risk Score: 8.3    Current PCP: Dewayne Nash, DO  PCP verified by CM? Yes    Chart Reviewed: Yes      History Provided by: Patient  Patient Orientation: Alert and Oriented    Patient Cognition: Alert    Hospitalization in the last 30 days (Readmission):  No    If yes,

## 2024-10-21 NOTE — PROGRESS NOTES
BRIEF COMPREHENSIVE GERIATRIC ASSESSMENT    Clinical Frailty Score (Evaristo Scale):  3-MANAGING WELL    Cognition:   [x]  Within normal limits     []  Mild cognitive impairment   []  Dementia  []  Delirium    Abbreviated Mental Test (AMT-4) score:  4 (age, , place, year; one point for knowing each)            []  Mental Capacity Assessment required (if AMT-4 score <4/4, consult speech for cog eval)  Main life-long occupation:    Highest level of education:  High School    Emotional:  [x]  Within normal limits     []  Dec mood     []  Depression  []  Anxiety  []  Fatigue    []  Hallucination     []  Delusion  []  Other  Motivation:    []  High    [x]  Usual  []  Low  Health attitude:     []  Excellent    []  Good    [x]  Fair    []  Poor     []  Couldn't say  Communication:  Speech:   [x]  WNL   []  Impaired Hearing:   []  WNL   []  Impaired           Vision:     []  WNL   [x]  Impaired (wear glasses) Understanding:   []  WNL   []  Impaired  Strength:   [x]  WNL   []  Weak Upper:    proximal    distal    Lower:    proximal    distal   Exercise:   []  Frequent    []  Occasional    [x]  None   What type of exercise?    Balance:   []  WNL   []  Impaired  # falls in last month:  None #falls in the last 6 months:  None  Mobility:  walk inside      [x]  Independent    []  Slow    []  Assisted    []  Can't         Walk outside   []  Independent    [x]  Slow    []  Assisted    []  Can't         Transfers         [x]  Independent    []  Slow    []  Assisted    []  Can't                    Bed (in/out)     [x]  Independent    []  Slow    []  Assisted    []  Can't                    Aid use            [x]  Independent    []  Slow    []  Assisted    []  Can't   Nutrition:  Weight:  [x]  Normal    []  Under    []  Over    []  Obese   []  Dietary consult           Appetite: [x]  WNL    []  Fair    []  Poor         Patient reported symptoms of dysphagia?  No Nurse reported? No         Patient passed a bedside swallow

## 2024-10-21 NOTE — PROGRESS NOTES
Occupational Therapy    OCCUPATIONAL THERAPY INITIAL EVALUATION    [facility]  [address] [place], [place]        Date:10/21/2024                                                  Patient Name: Jeronimo Atkins    MRN: [id number]    : 5/15/1930    Room: [address] [place] [address]          Evaluating OT: Maia Nicholson, OTD, OTR/L; [id number]      Occupational therapy physician order:   Start   Ordering Provider    10/20/24 1230  OT eval and treat  Start:  10/20/24 1230,   End:  10/20/24 1230,   ONE TIME,   Standing Count:  1 Occurrences,   R         Martin Sterling, DO        Diagnosis:    1. Facial laceration, initial encounter    2. Closed fracture of nasal bone, initial encounter    3. Closed odontoid fracture, initial encounter (HCC)    4. Closed displaced fracture of first cervical vertebra, unspecified fracture morphology, initial encounter ([facility])    5. 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)    Atherosclerotic plaque    Fracture of anterior arch of C1, closed, initial encounter (HCC)    Closed odontoid fracture (HCC)    Closed fracture of nasal bone    Facial laceration    Closed displaced fracture of first cervical vertebra (HCC)    Anticoagulated          Pertinent Medical History:   Past Medical History:   Diagnosis Date    Atherosclerotic plaque 2017    Aorta    Bilateral ovarian cysts     Disease of blood and blood forming organ     Hypothyroidism     Pneumonia     Temporary low platelet count (HCC)     Thrombosis 2015    PE          Surgery: none this admission        Recommended Adaptive Equipment: TBD       Precautions:  Fall Risk, C1,C2 fx, custom C collar, spinal precautions, nasal bone fx (no nose blowing x4-6 weeks and keep HOB elevated)     Assessment

## 2024-10-21 NOTE — PROGRESS NOTES
Trauma Tertiary Survey    Admit Date: 10/20/2024  Hospital day 1    CC:  s/p fall    Alcohol pre-screening:  Women: How many times in the past year have you had 4 or more drinks in a day? none  How much do you drink on a daily basis? denies    Drug Pre-screening:    How many times in the past year have you used a recreational drug or used a prescription medication for non medical reasons?  none    Mood Prescreening:    During the past two weeks, have you been bothered by little interest or pleasure doing things?  No  During the past two weeks, have you been bothered by feeling down, depressed or hopeless?  No      Scheduled Meds:   acetaminophen  650 mg Oral 4 times per day    sodium chloride flush  5-40 mL IntraVENous 2 times per day    levothyroxine  75 mcg Oral Daily     Continuous Infusions:   sodium chloride       PRN Meds:oxyCODONE **OR** oxyCODONE, sodium chloride flush, sodium chloride, ondansetron **OR** ondansetron, polyethylene glycol, sodium chloride flush    Subjective:     Resting in bed. No new complaints    Objective:   Patient Vitals for the past 8 hrs:   BP Temp Temp src Pulse Resp SpO2   10/21/24 0400 136/60 98.2 °F (36.8 °C) Temporal 74 16 96 %   10/21/24 0005 (!) 132/54 97.6 °F (36.4 °C) Temporal 89 16 95 %       I/O last 3 completed shifts:  In: 420 [P.O.:420]  Out: -   No intake/output data recorded.    Past Medical History:   Diagnosis Date    Atherosclerotic plaque 7/31/2017    Aorta    Bilateral ovarian cysts     Disease of blood and blood forming organ     Hypothyroidism     Pneumonia     Temporary low platelet count (HCC)     Thrombosis 5/2015    PE       @[email or web address]@    Radiology:  CTA NECK W CONTRAST   Final Result   No evidence of acute traumatic arterial injury in the neck.         XR CHEST PORTABLE   Final Result      No acute findings in the chest.         XR PELVIS (1-2 VIEWS)   Final Result   1. No acute traumatic findings of the bony pelvis on single view   2. Degenerative changes

## 2024-10-21 NOTE — PROGRESS NOTES
Instructed pt. And pts daughter trauma recommends stop taking warfarin due to INR level 3.5. And to follow up with PCP.

## 2024-10-21 NOTE — PLAN OF CARE
Problem: Discharge Planning  Goal: Discharge to home or other facility with appropriate resources  Outcome: Completed     Problem: Skin/Tissue Integrity  Goal: Absence of new skin breakdown  Description: 1.  Monitor for areas of redness and/or skin breakdown  2.  Assess vascular access sites hourly  3.  Every 4-6 hours minimum:  Change oxygen saturation probe site  4.  Every 4-6 hours:  If on nasal continuous positive airway pressure, respiratory therapy assess nares and determine need for appliance change or resting period.  Outcome: Completed     Problem: Safety - Adult  Goal: Free from fall injury  Outcome: Completed     Problem: ABCDS Injury Assessment  Goal: Absence of physical injury  Outcome: Completed

## 2024-10-21 NOTE — PROGRESS NOTES
GERIATRIC TRAUMA RESIDENT INITIAL ASSESSMENT    Chief Complaint   Patient presents with    Fall     Fall around 0300 walking to bathroom. Pt got dizzy, hit head on dresser. -nausea/vomiting. -LOC    Laceration     Lac to L eyebrow and nose        Mechanism of Injury:  mechanical ground-level fall at 3 AM on the day of admission.    Loss of consciousness:      HPI:  Patient states that she was in her bathroom when she tripped over her cane and hit her head.  She denies any loss of consciousness.  She was able to get up and clean up her blood and informed her family member.  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  On initial presentation she did complain of neck pain.  Labs were significant for INR 3.4.  CT head was negative.  CT cervical spine showed minimally displaced anterior arch C1 fracture and mildly displaced C2 fracture.  Negative for hematoma.  CT face shows bilateral nasal bone fracture.  She also did have a left supraorbital laceration that was s/p repaired by ED.    Past Medical History:   Diagnosis Date    Atherosclerotic plaque 7/31/2017    Aorta    Bilateral ovarian cysts     Disease of blood and blood forming organ     Hypothyroidism     Pneumonia     Temporary low platelet count (HCC)     Thrombosis 5/2015    PE       Past Surgical History:   Procedure Laterality Date    APPENDECTOMY      CHOLECYSTECTOMY      COLONOSCOPY      5 years ago scheduled 7/2015 with Dr Beltran    ECHOCARDIOGRAM LIMITED  5/5/2015         TONSILLECTOMY      VASCULAR SURGERY      IVC filter       Family History   Problem Relation Age of Onset    Stroke Sister     Heart Disease Father        Allergies   Allergen Reactions    Heparin      platelets drop       Social History     Tobacco Use    Smoking status: Never    Smokeless tobacco: Never   Vaping Use    Vaping status: Never Used   Substance Use Topics    Alcohol use: No     Alcohol/week: 0.0 standard drinks of alcohol    Drug use: No       Medications:  Medications

## 2024-10-21 NOTE — DISCHARGE SUMMARY
Physician Discharge Summary     Patient ID:  Jeronimo Tobaro  [id number]  89 y.o.  5/15/1930    Admit date: 10/20/2024    Discharge date: 10/21/24    Admitting Physician: Jeovanny Meyer MD     Admission Diagnoses: Anticoagulated [Z79.01]  Facial laceration, initial encounter [S01.81XA]  Closed fracture of nasal bone, initial encounter [S02.2XXA]  Fracture of anterior arch of C1, closed, initial encounter ([facility]) [S12.090A]  Closed displaced fracture of first cervical vertebra, unspecified fracture morphology, initial encounter ([facility]) [S12.000A]  Closed odontoid fracture, initial encounter ([facility]) [S12.100A]    Discharge Diagnoses: Principal Problem:    Fracture of anterior arch of C1, closed, initial encounter ([facility])  Active Problems:    Closed odontoid fracture ([facility])    Closed fracture of nasal bone    Facial laceration    Closed displaced fracture of first cervical vertebra ([facility])    Anticoagulated  Resolved Problems:    * No resolved hospital problems. *      Admission Condition: stable    Discharged Condition: stable    Indication for Admission: s/p mechanical fall    Hospital Course/Procedures/Operation/treatments:   10/20: Pt states that she was in her bathroom when she tripped over her cane and hit her head. Denies any loss of consciousness. She was able to get up and clean up her blood and called her family. She currently only complains of neck pain. History significant for taking warfarin due to history of DVTs. 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 Initial INR 3.4. CT head negative. CT C-spine shows minimally displaced anterior arch C1 fracture and mildly displaced C2 dens fracture. Negative for hematoma. CT face shows bilateral nasal bone fracture.   10/21: No new findings on tertiary. Resting in bed. Custom collar in place      Consults:   IP CONSULT TO TRAUMA SURGERY  IP CONSULT TO NEUROSURGERY  INPATIENT CONSULT TO ORTHOTIST/PROSTHETIST  IP CONSULT TO

## 2024-10-24 ENCOUNTER — TELEPHONE (OUTPATIENT)
Dept: ENT CLINIC | Age: 89
End: 2024-10-24

## 2024-10-24 ENCOUNTER — TELEPHONE (OUTPATIENT)
Dept: NEUROSURGERY | Age: 89
End: 2024-10-24

## 2024-10-24 DIAGNOSIS — S12.100A CLOSED ODONTOID FRACTURE, INITIAL ENCOUNTER (HCC): Primary | ICD-10-CM

## 2024-10-24 NOTE — TELEPHONE ENCOUNTER
Patients daughter called asking if it's ok for her to use a wedge pillow as it's hard for her to get out of bed laying flat. Or if there is another suggestion, they would be open to that as well. Please advise.

## 2024-10-24 NOTE — TELEPHONE ENCOUNTER
Pts daughter called to schedule a hospital follow up with Dr Navarrete.  She needs to be seen for a follow up for a nasal fracture.

## 2024-11-01 ENCOUNTER — OFFICE VISIT (OUTPATIENT)
Dept: SURGERY | Age: 89
End: 2024-11-01
Payer: MEDICARE

## 2024-11-01 VITALS
RESPIRATION RATE: 16 BRPM | SYSTOLIC BLOOD PRESSURE: 135 MMHG | TEMPERATURE: 98.7 F | HEIGHT: 56 IN | BODY MASS INDEX: 27.9 KG/M2 | OXYGEN SATURATION: 96 % | DIASTOLIC BLOOD PRESSURE: 59 MMHG | HEART RATE: 68 BPM | WEIGHT: 124 LBS

## 2024-11-01 DIAGNOSIS — S01.81XD FACIAL LACERATION, SUBSEQUENT ENCOUNTER: Primary | ICD-10-CM

## 2024-11-01 PROCEDURE — 99212 OFFICE O/P EST SF 10 MIN: CPT | Performed by: NURSE PRACTITIONER

## 2024-11-01 RX ORDER — WARFARIN SODIUM 3 MG/1
3 TABLET ORAL DAILY
COMMUNITY
Start: 2024-09-10

## 2024-11-01 ASSESSMENT — ENCOUNTER SYMPTOMS
SHORTNESS OF BREATH: 0
CHOKING: 0
CHEST TIGHTNESS: 0
COUGH: 0

## 2024-11-01 NOTE — PROGRESS NOTES
Left eyebrow laceration sutures removed without difficulty. Patient tolerated well.  Electronically signed by Zuleyma Gomez MA on 11/1/24 at 11:34 AM EDT    
were reviewed.  Emphasized the need to avoid falling, not being on any steps or ladders.  Also to maintain her collar and to follow-up with neurosurgery.  Make a follow-up as needed          GREGRO Scherer - CNP  11/1/2024  1:09 PM    Total time: 20 minutes

## 2024-11-11 ENCOUNTER — OFFICE VISIT (OUTPATIENT)
Dept: ENT CLINIC | Age: 89
End: 2024-11-11
Payer: MEDICARE

## 2024-11-11 VITALS — BODY MASS INDEX: 26.77 KG/M2 | WEIGHT: 119 LBS | HEIGHT: 56 IN | OXYGEN SATURATION: 99 % | TEMPERATURE: 97.3 F

## 2024-11-11 DIAGNOSIS — S02.2XXA CLOSED FRACTURE OF NASAL BONE, INITIAL ENCOUNTER: Primary | ICD-10-CM

## 2024-11-11 PROCEDURE — 1159F MED LIST DOCD IN RCRD: CPT | Performed by: OTOLARYNGOLOGY

## 2024-11-11 PROCEDURE — 1123F ACP DISCUSS/DSCN MKR DOCD: CPT | Performed by: OTOLARYNGOLOGY

## 2024-11-11 PROCEDURE — 99203 OFFICE O/P NEW LOW 30 MIN: CPT | Performed by: OTOLARYNGOLOGY

## 2024-11-11 NOTE — PROGRESS NOTES
[facility]  Dr. Julio Navarrete D.O. Ms.Ed.  New Consult       Patient Name:  Jeronimo Atkins  :  5/15/1930     CHIEF C/O:    Chief Complaint   Patient presents with    Facial Injury     Pt fell and broke her nose she also had stitches on her nose. She was at ED on 10/20/24       HISTORY OBTAINED FROM:  patient    HISTORY OF PRESENT ILLNESS:       Jeronimo is a 89 y.o. year old female, here today for:       Patient presents for evaluation of a history of status post traumatic fall with nasal bone fractures seen on CT scan of the facial bones approximately 7 days prior.  She denies any current facial swelling, she denies any difficulty breathing through the nose, and no active epistaxis.  CT scan is reviewed reviewed, with the patient as well as the daughter who is present with the patient today.  No other complaints today of new vision changes ear pain nasal congestion sore throat sore throat fever chills.           Past Medical History:   Diagnosis Date    Atherosclerotic plaque 2017    Aorta    Bilateral ovarian cysts     Disease of blood and blood forming organ     Hypothyroidism     Pneumonia     Temporary low platelet count (HCC)     Thrombosis 2015    PE     Past Surgical History:   Procedure Laterality Date    APPENDECTOMY      CHOLECYSTECTOMY      COLONOSCOPY      5 years ago scheduled 2015 with Dr Beltran    ECHOCARDIOGRAM LIMITED  2015         TONSILLECTOMY      VASCULAR SURGERY      IVC filter       Current Outpatient Medications:     JANTOVEN 3 MG tablet, Take 1 tablet by mouth daily, Disp: , Rfl:     lisinopril (PRINIVIL;ZESTRIL) 2.5 MG tablet, Take 1 tablet by mouth daily, Disp: , Rfl:     Cyanocobalamin (VITAMIN B 12 PO), Take by mouth, Disp: , Rfl:     levothyroxine (SYNTHROID) 88 MCG tablet, Take 75 mcg by mouth Daily, Disp: , Rfl:     calcium carbonate (OSCAL) 500 MG TABS tablet, Take 1 tablet by mouth daily, Disp: , Rfl:     Elastic Bandages & Supports (JOBST KNEE HIGH

## 2024-11-14 ASSESSMENT — ENCOUNTER SYMPTOMS
SHORTNESS OF BREATH: 0
VOMITING: 0
COUGH: 0

## 2024-11-21 ENCOUNTER — HOSPITAL ENCOUNTER (OUTPATIENT)
Dept: GENERAL RADIOLOGY | Age: 89
Discharge: HOME | End: 2024-11-23
Payer: MEDICARE

## 2024-11-21 ENCOUNTER — HOSPITAL ENCOUNTER (OUTPATIENT)
Age: 89
Discharge: HOME | End: 2024-11-23
Payer: MEDICARE

## 2024-11-21 DIAGNOSIS — S12.100A CLOSED ODONTOID FRACTURE, INITIAL ENCOUNTER (HCC): ICD-10-CM

## 2024-11-21 PROCEDURE — 72040 X-RAY EXAM NECK SPINE 2-3 VW: CPT

## 2024-11-29 ENCOUNTER — HOSPITAL ENCOUNTER (EMERGENCY)
Age: 88
Discharge: HOME | End: 2024-11-29
Attending: EMERGENCY MEDICINE
Payer: MEDICARE

## 2024-11-29 ENCOUNTER — APPOINTMENT (OUTPATIENT)
Dept: GENERAL RADIOLOGY | Age: 88
End: 2024-11-29
Payer: MEDICARE

## 2024-11-29 VITALS
RESPIRATION RATE: 16 BRPM | HEART RATE: 63 BPM | TEMPERATURE: 97.8 F | DIASTOLIC BLOOD PRESSURE: 60 MMHG | OXYGEN SATURATION: 96 % | SYSTOLIC BLOOD PRESSURE: 125 MMHG

## 2024-11-29 DIAGNOSIS — R11.2 NAUSEA AND VOMITING, UNSPECIFIED VOMITING TYPE: ICD-10-CM

## 2024-11-29 DIAGNOSIS — U07.1 COVID: Primary | ICD-10-CM

## 2024-11-29 LAB
ALBUMIN SERPL-MCNC: 4 G/DL (ref 3.5–5.2)
ALP SERPL-CCNC: 89 U/L (ref 35–104)
ALT SERPL-CCNC: 22 U/L (ref 0–32)
ANION GAP SERPL CALCULATED.3IONS-SCNC: 12 MMOL/L (ref 7–16)
AST SERPL-CCNC: 24 U/L (ref 0–31)
BASOPHILS # BLD: 0.03 K/UL (ref 0–0.2)
BASOPHILS NFR BLD: 1 % (ref 0–2)
BILIRUB SERPL-MCNC: 0.4 MG/DL (ref 0–1.2)
BNP SERPL-MCNC: 85 PG/ML (ref 0–450)
BUN SERPL-MCNC: 16 MG/DL (ref 6–23)
CALCIUM SERPL-MCNC: 9.1 MG/DL (ref 8.6–10.2)
CHLORIDE SERPL-SCNC: 98 MMOL/L (ref 98–107)
CO2 SERPL-SCNC: 20 MMOL/L (ref 22–29)
CREAT SERPL-MCNC: 1 MG/DL (ref 0.5–1)
EKG ATRIAL RATE: 87 BPM
EKG P AXIS: 46 DEGREES
EKG P-R INTERVAL: 140 MS
EKG Q-T INTERVAL: 350 MS
EKG QRS DURATION: 66 MS
EKG QTC CALCULATION (BAZETT): 421 MS
EKG R AXIS: -20 DEGREES
EKG T AXIS: 7 DEGREES
EKG VENTRICULAR RATE: 87 BPM
EOSINOPHIL # BLD: 0.15 K/UL (ref 0.05–0.5)
EOSINOPHILS RELATIVE PERCENT: 2 % (ref 0–6)
ERYTHROCYTE [DISTWIDTH] IN BLOOD BY AUTOMATED COUNT: 13.5 % (ref 11.5–15)
GFR, ESTIMATED: 52 ML/MIN/1.73M2
GLUCOSE SERPL-MCNC: 99 MG/DL (ref 74–99)
HCT VFR BLD AUTO: 43.1 % (ref 34–48)
HGB BLD-MCNC: 14 G/DL (ref 11.5–15.5)
IMM GRANULOCYTES # BLD AUTO: 0.06 K/UL (ref 0–0.58)
IMM GRANULOCYTES NFR BLD: 1 % (ref 0–5)
LACTATE BLDV-SCNC: 1 MMOL/L (ref 0.5–2.2)
LIPASE SERPL-CCNC: 15 U/L (ref 13–60)
LYMPHOCYTES NFR BLD: 1.29 K/UL (ref 1.5–4)
LYMPHOCYTES RELATIVE PERCENT: 20 % (ref 20–42)
MCH RBC QN AUTO: 27.6 PG (ref 26–35)
MCHC RBC AUTO-ENTMCNC: 32.5 G/DL (ref 32–34.5)
MCV RBC AUTO: 84.8 FL (ref 80–99.9)
MONOCYTES NFR BLD: 0.45 K/UL (ref 0.1–0.95)
MONOCYTES NFR BLD: 7 % (ref 2–12)
NEUTROPHILS NFR BLD: 70 % (ref 43–80)
NEUTS SEG NFR BLD: 4.62 K/UL (ref 1.8–7.3)
PLATELET # BLD AUTO: 291 K/UL (ref 130–450)
PMV BLD AUTO: 8.5 FL (ref 7–12)
POTASSIUM SERPL-SCNC: 4.8 MMOL/L (ref 3.5–5)
PROT SERPL-MCNC: 7.7 G/DL (ref 6.4–8.3)
RBC # BLD AUTO: 5.08 M/UL (ref 3.5–5.5)
SODIUM SERPL-SCNC: 130 MMOL/L (ref 132–146)
TROPONIN I SERPL HS-MCNC: 17 NG/L (ref 0–9)
TROPONIN I SERPL HS-MCNC: 17 NG/L (ref 0–9)
WBC OTHER # BLD: 6.6 K/UL (ref 4.5–11.5)

## 2024-11-29 PROCEDURE — 84484 ASSAY OF TROPONIN QUANT: CPT

## 2024-11-29 PROCEDURE — 83690 ASSAY OF LIPASE: CPT

## 2024-11-29 PROCEDURE — 99285 EMERGENCY DEPT VISIT HI MDM: CPT

## 2024-11-29 PROCEDURE — 96374 THER/PROPH/DIAG INJ IV PUSH: CPT

## 2024-11-29 PROCEDURE — 80053 COMPREHEN METABOLIC PANEL: CPT

## 2024-11-29 PROCEDURE — 83605 ASSAY OF LACTIC ACID: CPT

## 2024-11-29 PROCEDURE — 6360000002 HC RX W HCPCS: Performed by: EMERGENCY MEDICINE

## 2024-11-29 PROCEDURE — 85025 COMPLETE CBC W/AUTO DIFF WBC: CPT

## 2024-11-29 PROCEDURE — 71045 X-RAY EXAM CHEST 1 VIEW: CPT

## 2024-11-29 PROCEDURE — 83880 ASSAY OF NATRIURETIC PEPTIDE: CPT

## 2024-11-29 RX ORDER — ONDANSETRON 2 MG/ML
4 INJECTION INTRAMUSCULAR; INTRAVENOUS ONCE
Status: COMPLETED | OUTPATIENT
Start: 2024-11-29 | End: 2024-11-29

## 2024-11-29 RX ORDER — ONDANSETRON 4 MG/1
4 TABLET, ORALLY DISINTEGRATING ORAL 3 TIMES DAILY PRN
Qty: 21 TABLET | Refills: 0 | Status: SHIPPED | OUTPATIENT
Start: 2024-11-29

## 2024-11-29 RX ADMIN — ONDANSETRON 4 MG: 2 INJECTION INTRAMUSCULAR; INTRAVENOUS at 15:32

## 2024-11-29 ASSESSMENT — LIFESTYLE VARIABLES
HOW MANY STANDARD DRINKS CONTAINING ALCOHOL DO YOU HAVE ON A TYPICAL DAY: PATIENT DOES NOT DRINK
HOW OFTEN DO YOU HAVE A DRINK CONTAINING ALCOHOL: NEVER

## 2024-11-29 NOTE — ED PROVIDER NOTES
[facility] EMERGENCY DEPARTMENT  EMERGENCY DEPARTMENT ENCOUNTER        Pt Name: Jeronimo Atkins  MRN: [id number]  Birthdate 5/15/1930  Date of evaluation: 11/29/2024  Provider: Juli Bishop DO  PCP: Dewayne Nash DO  Note Started: 3:21 PM EST 11/29/24    CHIEF COMPLAINT       Chief Complaint   Patient presents with    Positive For Covid-19     Patient states she tested positive for Covid Tues. +n/v/d. States she is unable to keep anything down       HISTORY OF PRESENT ILLNESS: 1 or more Elements   History From: Patient    Limitations to history : None    Jeronimo Atkins is a 89 y.o. female who presents with concern for nausea and vomiting, diarrhea and cough.  She notes that she did test positive for COVID on Tuesday and she is been having a hard time eating since then, no fevers, cough is nonproductive, no chest pain or shortness of breath, no pain or swelling in her lower extremities.  Has otherwise been able to go about her daily activities.  She notes that she was in an accident about a month ago and has been wearing a c-collar since then, no other associated complaints.      EXTERNAL NOTE REVIEW:      On chart review last saw ENT for nasal bone fracture on 11/11/2024    REVIEW OF SYSTEMS :      Positives and Pertinent negatives as per HPI.     SURGICAL HISTORY     Past Surgical History:   Procedure Laterality Date    APPENDECTOMY      CHOLECYSTECTOMY      COLONOSCOPY      5 years ago scheduled 7/2015 with Dr Beltran    ECHOCARDIOGRAM LIMITED  5/5/2015         TONSILLECTOMY      VASCULAR SURGERY      IVC filter       CURRENTMEDICATIONS       Discharge Medication List as of 11/29/2024  6:19 PM        CONTINUE these medications which have NOT CHANGED    Details   JANTOVEN 3 MG tablet Take 1 tablet by mouth daily, DAWHistorical Med      lisinopril (PRINIVIL;ZESTRIL) 2.5 MG tablet Take 1 tablet by mouth dailyHistorical Med      Cyanocobalamin (VITAMIN B 12 PO) Take  program.  Efforts were made to edit the dictations but occasionally words are mis-transcribed.)    Juli Bishop DO (electronically signed)            Juli Bishop DO  11/29/24 0451

## 2024-12-03 LAB
EKG ATRIAL RATE: 87 BPM
EKG P AXIS: 46 DEGREES
EKG P-R INTERVAL: 140 MS
EKG Q-T INTERVAL: 350 MS
EKG QRS DURATION: 66 MS
EKG QTC CALCULATION (BAZETT): 421 MS
EKG R AXIS: -20 DEGREES
EKG T AXIS: 7 DEGREES
EKG VENTRICULAR RATE: 87 BPM

## 2024-12-04 ENCOUNTER — TELEPHONE (OUTPATIENT)
Dept: NEUROSURGERY | Age: 88
End: 2024-12-04

## 2024-12-04 NOTE — TELEPHONE ENCOUNTER
Patients sister called with question about her c spine xray on 11/21   she does have a follow up appointment with [facility] on 12-12  they want to know if you want another xray before her appointment

## 2024-12-09 ENCOUNTER — HOSPITAL ENCOUNTER (EMERGENCY)
Age: 88
Discharge: HOME | End: 2024-12-09
Payer: MEDICARE

## 2024-12-09 VITALS
TEMPERATURE: 98.2 F | OXYGEN SATURATION: 99 % | HEIGHT: 67 IN | BODY MASS INDEX: 18.05 KG/M2 | SYSTOLIC BLOOD PRESSURE: 144 MMHG | HEART RATE: 89 BPM | RESPIRATION RATE: 16 BRPM | DIASTOLIC BLOOD PRESSURE: 59 MMHG | WEIGHT: 115 LBS

## 2024-12-09 DIAGNOSIS — S61.411A SKIN TEAR OF HAND WITHOUT COMPLICATION, RIGHT, INITIAL ENCOUNTER: Primary | ICD-10-CM

## 2024-12-09 PROCEDURE — 99282 EMERGENCY DEPT VISIT SF MDM: CPT

## 2024-12-09 ASSESSMENT — PAIN DESCRIPTION - ONSET: ONSET: ON-GOING

## 2024-12-09 ASSESSMENT — PAIN DESCRIPTION - PAIN TYPE: TYPE: ACUTE PAIN

## 2024-12-09 ASSESSMENT — PAIN DESCRIPTION - DESCRIPTORS: DESCRIPTORS: DISCOMFORT

## 2024-12-09 ASSESSMENT — PAIN DESCRIPTION - ORIENTATION: ORIENTATION: RIGHT

## 2024-12-09 ASSESSMENT — PAIN DESCRIPTION - LOCATION: LOCATION: HAND

## 2024-12-09 ASSESSMENT — PAIN SCALES - GENERAL: PAINLEVEL_OUTOF10: 3

## 2024-12-09 ASSESSMENT — PAIN - FUNCTIONAL ASSESSMENT: PAIN_FUNCTIONAL_ASSESSMENT: 0-10

## 2024-12-09 ASSESSMENT — PAIN DESCRIPTION - FREQUENCY: FREQUENCY: CONTINUOUS

## 2024-12-10 ASSESSMENT — ENCOUNTER SYMPTOMS
RESPIRATORY NEGATIVE: 1
ALLERGIC/IMMUNOLOGIC NEGATIVE: 1
EYES NEGATIVE: 1
GASTROINTESTINAL NEGATIVE: 1

## 2024-12-11 DIAGNOSIS — Z01.818 PRE-OP TESTING: Primary | ICD-10-CM

## 2024-12-11 DIAGNOSIS — S12.090A FRACTURE OF ANTERIOR ARCH OF C1, CLOSED, INITIAL ENCOUNTER (HCC): ICD-10-CM

## 2024-12-12 ENCOUNTER — OFFICE VISIT (OUTPATIENT)
Dept: NEUROSURGERY | Age: 88
End: 2024-12-12
Payer: MEDICARE

## 2024-12-12 DIAGNOSIS — S12.100D CLOSED ODONTOID FRACTURE WITH ROUTINE HEALING, SUBSEQUENT ENCOUNTER: Primary | ICD-10-CM

## 2024-12-12 PROCEDURE — 1159F MED LIST DOCD IN RCRD: CPT | Performed by: STUDENT IN AN ORGANIZED HEALTH CARE EDUCATION/TRAINING PROGRAM

## 2024-12-12 PROCEDURE — 1123F ACP DISCUSS/DSCN MKR DOCD: CPT | Performed by: STUDENT IN AN ORGANIZED HEALTH CARE EDUCATION/TRAINING PROGRAM

## 2024-12-12 PROCEDURE — 99213 OFFICE O/P EST LOW 20 MIN: CPT | Performed by: STUDENT IN AN ORGANIZED HEALTH CARE EDUCATION/TRAINING PROGRAM

## 2024-12-12 NOTE — PROGRESS NOTES
Hospital Follow-up     Subjective: Jeronimo Atkins is a 89 y.o.  female who originally presented to the hospital after a fall. Patient was found to have a C2 fractures. She was placed in a C-Collar. Patient presents for 2 month follow up, as she couldn't make it to her 1 month follow up d/t tg COVID.     Patient states she is doing well. She denies any significant neck pain. No pain down the arms. No numbness or weakness. C-Collar complaint. XR reviewed.      Physical Exam:              WDWN, no apparent distress              Non-labored breathing               Vitals Stable              Alert and oriented x3              CN 3-12 intact              PERRL              EOMI              THOMPSON well              Motor strength symmetric              Sensation to LT intact bilaterally   In C-Collar                Imagin2024 XR Cervical Spine   Poor visualization of known odontoid fracture.  There may be slightly greater posterior tilting/displacement of the odontoid in relation to C2 compared to the CT.     Other degenerative changes.     Assessment: This is a 89 y.o.  female presenting for a 2 month follow up s/p C2 fracture.       Plan:  -Pain control and expectations discussed  -Continue C-Collar and restrictions   -OARRS report reviewed   -Follow-up in neurosurgery clinic in 1 month with XR  -Call or return to neurosurgery office sooner if symptoms worsen or if new issues arise in the interim.    Electronically signed by Melissa Marlow PA-C on 2024 at 1:26 PM

## 2025-01-27 ENCOUNTER — HOSPITAL ENCOUNTER (OUTPATIENT)
Age: 89
Discharge: HOME | End: 2025-01-29
Payer: MEDICARE

## 2025-01-27 ENCOUNTER — HOSPITAL ENCOUNTER (OUTPATIENT)
Dept: GENERAL RADIOLOGY | Age: 89
Discharge: HOME | End: 2025-01-29
Payer: MEDICARE

## 2025-01-27 DIAGNOSIS — S12.090A FRACTURE OF ANTERIOR ARCH OF C1, CLOSED, INITIAL ENCOUNTER (HCC): ICD-10-CM

## 2025-01-27 PROCEDURE — 72040 X-RAY EXAM NECK SPINE 2-3 VW: CPT

## 2025-01-28 ENCOUNTER — OFFICE VISIT (OUTPATIENT)
Dept: NEUROSURGERY | Age: 89
End: 2025-01-28
Payer: MEDICARE

## 2025-01-28 VITALS — BODY MASS INDEX: 21.71 KG/M2 | RESPIRATION RATE: 16 BRPM | HEIGHT: 61 IN | WEIGHT: 115 LBS

## 2025-01-28 DIAGNOSIS — S12.100D CLOSED ODONTOID FRACTURE WITH ROUTINE HEALING, SUBSEQUENT ENCOUNTER: Primary | ICD-10-CM

## 2025-01-28 PROCEDURE — 1126F AMNT PAIN NOTED NONE PRSNT: CPT | Performed by: STUDENT IN AN ORGANIZED HEALTH CARE EDUCATION/TRAINING PROGRAM

## 2025-01-28 PROCEDURE — 99213 OFFICE O/P EST LOW 20 MIN: CPT | Performed by: STUDENT IN AN ORGANIZED HEALTH CARE EDUCATION/TRAINING PROGRAM

## 2025-01-28 PROCEDURE — 1123F ACP DISCUSS/DSCN MKR DOCD: CPT | Performed by: STUDENT IN AN ORGANIZED HEALTH CARE EDUCATION/TRAINING PROGRAM

## 2025-01-28 PROCEDURE — 1159F MED LIST DOCD IN RCRD: CPT | Performed by: STUDENT IN AN ORGANIZED HEALTH CARE EDUCATION/TRAINING PROGRAM

## 2025-01-28 NOTE — PROGRESS NOTES
Office Follow-up     Subjective: Jeronimo Atkins is a 89 y.o.  female who originally presented to the hospital after a fall. Patient was found to have a C2 fractures. She was placed in a C-Collar. Patient presents for 3 month follow up.    Patient states she is doing well. She denies any neck pain or pain down the arms. No numbness or weakness. C-Collar complaint. XR reviewed.      Physical Exam:              WDWN, no apparent distress              Non-labored breathing               Vitals Stable              Alert and oriented x3              CN 3-12 intact              PERRL              EOMI              THOMPSON well              Motor strength symmetric              Sensation to LT intact bilaterally   In C-Collar                Imagin2024 XR Cervical Spine   Hard to visualize fracture, XR stable-final read pending.      Assessment: This is a 89 y.o.  female presenting for a 3 month follow up s/p C2 fracture.       Plan:  -Pain control and expectations discussed  -Can discontinue C-Collar and restrictions   -OARRS report reviewed   -Follow-up in neurosurgery clinic prn  -Call or return to neurosurgery office sooner if symptoms worsen or if new issues arise in the interim.    Electronically signed by Melissa Marlow PA-C on 2025 at 2:56 PM